# Patient Record
(demographics unavailable — no encounter records)

---

## 2024-11-06 NOTE — ASSESSMENT
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  PMH: Type II DM on alogliptin, HTN, HLD, hypothyroidism  S/p preemptive  donor kidney transplantation on 24.  Donor was 34 yrs old, DCD, KDPI 18%, Terminal Cr 0.73.  2 renal arteries to patch, 1 vein and ureter with stent in ureteroneocystostomy. CIT: 22 hours CMV +/+, EBV +/+  S/p preemptive DCD DDRT on 24 Good graft function, Cr 0.79mg/dL on last visit, pending today, no proteinuria down from 4.8 grams pre transplant  RLQ pain/discomfort - obtain transplant kidney ultrasound   Immunosuppression  Thymoglobulin induction completed  On Envarsus 7mg po daily. Aim for trough ~ 8 Reduce MMF to 500mg po BID, Mild leukopenia 3.3 Prednisone 5mg daily   Infection prophylaxis:  Valcyte 450mg po daily x 6 months- intermediate risk for CMV  Bactrim SS daily   DM type II : A1c 7%.  Continue Alogliptin 12.5mg daily   Hypothyroidism - Continue Synthroid 88mcg daily    HTN - BP acceptable at home without meds.  BP high in office today b/c she is anxious and upset about the election results.    Anemia - multifactorial - CKD, surgery, meds. Received epo 10,000 units sq x1  and . Currently on Procrit 10,000 units sq week, Hgb improved received last dose of Procrit on 24.    Hypomagnesemia -Continue supplement 400gm po bid   Vitamin D insufficiency -Vitamin D 1000 units daily   Flu shot given in clinic 10/2024   Obtain transplant kidney US to evaluate RLQ pain  F/u scheduled 24

## 2024-11-06 NOTE — PHYSICAL EXAM
[General Appearance - Alert] : alert [General Appearance - In No Acute Distress] : in no acute distress [Sclera] : the sclera and conjunctiva were normal [PERRL With Normal Accommodation] : pupils were equal in size, round, and reactive to light [Extraocular Movements] : extraocular movements were intact [Oropharynx] : the oropharynx was normal [Neck Cervical Mass (___cm)] : no neck mass was observed [Jugular Venous Distention Increased] : there was no jugular-venous distention [Respiration, Rhythm And Depth] : normal respiratory rhythm and effort [Exaggerated Use Of Accessory Muscles For Inspiration] : no accessory muscle use [Auscultation Breath Sounds / Voice Sounds] : lungs were clear to auscultation bilaterally [Heart Rate And Rhythm] : heart rate was normal and rhythm regular [Heart Sounds] : normal S1 and S2 [Heart Sounds Gallop] : no gallops [Murmurs] : no murmurs [Edema] : there was no peripheral edema [Abdomen Soft] : soft [] : right dorsalis pedis palpable [No Focal Deficits] : no focal deficits [Oriented To Time, Place, And Person] : oriented to person, place, and time [FreeTextEntry1] : No tremors

## 2024-11-06 NOTE — HISTORY OF PRESENT ILLNESS
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 Nephrologist Dr. Mariela Pollard.  Underwent or pre-emptive  donor kidney transplant on 24.   Donor 34, DCD, KDPI 18%, Terminal Cr 0.73.  2 renal arteries to one patch, 1 vein and ureter with stent, CIT 22 hours.  Thymo induction used  Post op anemia received 2 units PRBC on  and , given Epogen.  Otherwise did well. Discharged on post op day # 4 with a declining serum creatinine of 1.28mg/dL  Transplant ureteric stent removed on 24   Presents for a scheduled follow up visit. Last seen 4 weeks ago.  Had abdominal pain and  RLE and bilateral LE swelling 3 weeks ago. US done showed no DVT.  LE swelling subsided.  But still has pain in the right lower abdomen.  Voiding urine without difficulty. No dysuria or hematuria. No abdominal pain, nausea, vomiting or diarrhea. Appetite is good. No fever, chills. No chest pain or shortness of breath, no leg swelling.  BP at home 120-130 systolic.  Blood glucose is well controlled.  Weight 150 lbs stable.  Walking 2-3 miles a day. Walks her dog.

## 2024-12-04 NOTE — PHYSICAL EXAM
[General Appearance - Alert] : alert [General Appearance - In No Acute Distress] : in no acute distress [Sclera] : the sclera and conjunctiva were normal [PERRL With Normal Accommodation] : pupils were equal in size, round, and reactive to light [Extraocular Movements] : extraocular movements were intact [Oropharynx] : the oropharynx was normal [Jugular Venous Distention Increased] : there was no jugular-venous distention [Neck Cervical Mass (___cm)] : no neck mass was observed [Respiration, Rhythm And Depth] : normal respiratory rhythm and effort [Exaggerated Use Of Accessory Muscles For Inspiration] : no accessory muscle use [Auscultation Breath Sounds / Voice Sounds] : lungs were clear to auscultation bilaterally [Heart Rate And Rhythm] : heart rate was normal and rhythm regular [Heart Sounds] : normal S1 and S2 [Heart Sounds Gallop] : no gallops [Murmurs] : no murmurs [Edema] : there was no peripheral edema [Abdomen Soft] : soft [] : right dorsalis pedis palpable [No Focal Deficits] : no focal deficits [Oriented To Time, Place, And Person] : oriented to person, place, and time [Abdomen Tenderness] : non-tender [FreeTextEntry1] : No tremors

## 2024-12-04 NOTE — ASSESSMENT
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  PMH: Type II DM on alogliptin, HTN, HLD, hypothyroidism  S/p preemptive  donor kidney transplantation on 24.  Donor was 34 yrs old, DCD, KDPI 18%, Terminal Cr 0.73.  2 renal arteries to patch, 1 vein and ureter with stent in ureteroneocystostomy. CIT: 22 hours CMV +/+, EBV +/+  S/p preemptive DCD DDRT on 24 Good graft function, Cr 0.68mg/dL on last visit, pending today, no proteinuria down from 4.8 grams pre transplant  Transplant kidney US on 24: No hydronephrosis or perinephric collection. No evidence of a significant renal artery stenosis.  Immunosuppression  Thymoglobulin induction completed  On Envarsus 7mg po daily. Aim for trough level 6- 8. On MMF  500mg po BID, dose lowered for mild leukopenia 3.3 Prednisone 5mg daily   Infection prophylaxis:  Valcyte 450mg po daily -Complete valcyte at the end of this month. intermediate risk for CMV  Continue Bactrim SS daily   DM type II : A1c 7%.  Continue Alogliptin 12.5mg daily   Hypothyroidism - Continue Synthroid 88mcg daily    HTN - BP elevated. Will start Amlodipine 5mg daily.   Anemia - multifactorial - CKD, surgery, meds. Received epo 10,000 units sq x1  and . Currently on Procrit 10,000 units sq week, Hgb improved received last dose of Procrit on 24.    Hypomagnesemia -Continue supplement 800gm po bid   Vitamin D insufficiency -Vitamin D 1000 units daily   HLD : continue rosuvastatin 10mg po qhs   Flu shot given in clinic 10/2024   Refer to Nephrologist Dr. Mariela Torres.  Labs in /u here scheduled 3/12/2025

## 2025-03-12 NOTE — HISTORY OF PRESENT ILLNESS
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 Nephrologist Dr. Mariela Pollard.  Underwent or pre-emptive  donor kidney transplant on 24.   Donor 34, DCD, KDPI 18%, Terminal Cr 0.73.  2 renal arteries to one patch, 1 vein and ureter with stent, CIT 22 hours.  Thymo induction used  Post op anemia received 2 units PRBC on  and , given Epogen.  Otherwise, did well. Discharged on post op day # 4 with a declining serum creatinine of 1.28mg/dL  Transplant ureteric stent removed on 24   Presents for a scheduled follow up visit. Last seen 3 months ago.  Blood glucose elevated with A1c of 11%, alogliptin discontinued and started on Ozempic by PCP.  Seen by primary Nephrologist Dr. Mariela Torres last month.  Feels well. Voiding urine without difficulty. No dysuria or hematuria.  No abdominal pain, nausea, vomiting or diarrhea. Appetite is good. No fever, chills. No chest pain or shortness of breath, no leg swelling.  BP at home 110//80 Checks Blood glucose once a day in AM Generally < 130.  Weight down 4lbs.  Walking 2-3 miles a day. Walks her dog.

## 2025-03-12 NOTE — ASSESSMENT
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 PMH: Type II DM on alogliptin, HTN, HLD, hypothyroidism S/p preemptive  donor kidney transplantation on 24. Donor was 34 yrs old, DCD, KDPI 18%, Terminal Cr 0.73. 2 renal arteries to patch, 1 vein and ureter with stent in ureteroneocystostomy. CIT: 22 hours CMV +/+, EBV +/+  S/p preemptive DCD DDRT on 24 Good graft function, Cr 0.7mg/dL on last visit, pending today, no proteinuria. Transplant kidney US on 24: No hydronephrosis or perinephric collection. No evidence of a significant renal artery stenosis.  Immunosuppression Thymoglobulin induction completed On Envarsus 7mg po daily. Aim for trough level 6- 8. On MMF 500mg po BID, dose lowered for mild leukopenia  Prednisone 5mg daily  Infection prophylaxis: Completed Valcyte 450mg po daily x 6 months prophylaxis  Continue Bactrim SS daily  DM type II : A1c  was 11% at PMD office up from 7.9% in December. Alogliptin discontinued and started Ozempic. Now on 0.5mg/week.  Weight down 4lbs.   Hypothyroidism - Continue Synthroid 88mcg daily  HTN - Controlled, continue Amlodipine 5mg po daily   Anemia - multifactorial - CKD, surgery, meds. Received epo 10,000 units sq x1  and . Hgb improved on Procrit, received last dose of Procrit on 24.  Hypomagnesemia -Resume supplement 800gm po bid  Vitamin D insufficiency -Vitamin D 2000 units daily  HLD : continue rosuvastatin 10mg po qhs  Flu shot given in clinic 10/2024 Follow up with Nephrologist Dr. Mariela Torres in May.  Return here scheduled .

## 2025-03-12 NOTE — PHYSICAL EXAM
[General Appearance - Alert] : alert [General Appearance - In No Acute Distress] : in no acute distress [Sclera] : the sclera and conjunctiva were normal [PERRL With Normal Accommodation] : pupils were equal in size, round, and reactive to light [Extraocular Movements] : extraocular movements were intact [Oropharynx] : the oropharynx was normal [Neck Cervical Mass (___cm)] : no neck mass was observed [Jugular Venous Distention Increased] : there was no jugular-venous distention [Respiration, Rhythm And Depth] : normal respiratory rhythm and effort [Exaggerated Use Of Accessory Muscles For Inspiration] : no accessory muscle use [Auscultation Breath Sounds / Voice Sounds] : lungs were clear to auscultation bilaterally [Heart Rate And Rhythm] : heart rate was normal and rhythm regular [Heart Sounds] : normal S1 and S2 [Heart Sounds Gallop] : no gallops [Murmurs] : no murmurs [Edema] : there was no peripheral edema [Abdomen Soft] : soft [Abdomen Tenderness] : non-tender [] : right dorsalis pedis palpable [No Focal Deficits] : no focal deficits [Oriented To Time, Place, And Person] : oriented to person, place, and time [FreeTextEntry1] : No tremors

## 2025-05-19 NOTE — HISTORY OF PRESENT ILLNESS
[FreeTextEntry1] : 64 yr old female with past medical history remarkable for CKD stage V due to alport syndrome s/p DDRT 6/24/24, HTN, HLD, hypothyroidism presenting to establish care for type 2 diabetes mellitus. Records were reviewed. Accompanied by her daughter to visit today, patient speaks Romanian and prefers to have daughter translating  PCP: Dr. Andres Schwarz  Hypothyroidism Diagnosed ~8 yrs ago (2017) Currently on synthroid 88 mcg daily Reports she had thyroid levels checked by PCP recently in March and were normal Denies cold intolerance, constipation, excessive fatigue Reports energy levels are good No fhx of thyroid disease  Current prednisone dose: 5 mg daily  Regarding diabetes history: Diagnosed with T2DM 15 yrs ago Hx of HHS/DKA: denies  A1c trend: 9.3%- 5/2025 11% - 3/2025 per patient report 7.9%- 12/2024 7% 9/2024  Current regimen: - Ozempic 1 mg weekly on Tuesdays (has taken for the past 1.5 months)  Denies nausea/vomiting, feeling well with Ozempic  Previous therapies tried: - Alogliptin -> stopped with start of ozempic - Metformin prior to kidney transplant  Glycemic monitoring: She is only checking in the morning once a day, did not bring glucometer to visit. Self report below: -240s in mornings previously prior to increase in Ozempic to 1 mg; lately -140s in mornings, this morning her fasting BG was 131 mg/dL She is interested in CGM, has never used previously  Hypoglycemia symptoms/events: denies  Diet: Breakfast- 2 boiled eggs; sometimes smoothie with banana, strawberries, oatmeal, and nuts  Lunch- eating 2 tortillas  Snacks- fruit or banana bread w/oatmeal (no sugar) Not eating too many vegetables lately  Sugary beverages- avoids, mostly drinks water  Physical activity: walking 2-3 miles a day  Family history of DM in mother and sisters   Diabetes complications and comorbidities: Retinopathy- denies, last seen by ophthalmology on 11/2024 Neuropathy- denies Nephropathy- hx of CKD V due to alport syndrome now s/p DDRT 6/2024 Last GFR: 95 on 4/2025 labs and UPC 0.1 on 4/2025  HTN- yes on amlodipine 5 mg daily Coronary artery disease- denies CVA- denies Dyslipidemia- yes on rosuvastatin 10 mg qhs Last lipid panel: , chol 216, HDL 89,  on 12/2024  Social: Lives with daughter  Tobacco use- denies ETOH- denies

## 2025-05-19 NOTE — HISTORY OF PRESENT ILLNESS
[FreeTextEntry1] : 64 yr old female with past medical history remarkable for CKD stage V due to alport syndrome s/p DDRT 6/24/24, HTN, HLD, hypothyroidism presenting to establish care for type 2 diabetes mellitus. Records were reviewed. Accompanied by her daughter to visit today, patient speaks Kazakh and prefers to have daughter translating  PCP: Dr. Andres Schwarz  Hypothyroidism Diagnosed ~8 yrs ago (2017) Currently on synthroid 88 mcg daily Reports she had thyroid levels checked by PCP recently in March and were normal Denies cold intolerance, constipation, excessive fatigue Reports energy levels are good No fhx of thyroid disease  Current prednisone dose: 5 mg daily  Regarding diabetes history: Diagnosed with T2DM 15 yrs ago Hx of HHS/DKA: denies  A1c trend: 9.3%- 5/2025 11% - 3/2025 per patient report 7.9%- 12/2024 7% 9/2024  Current regimen: - Ozempic 1 mg weekly on Tuesdays (has taken for the past 1.5 months)  Denies nausea/vomiting, feeling well with Ozempic  Previous therapies tried: - Alogliptin -> stopped with start of ozempic - Metformin prior to kidney transplant  Glycemic monitoring: She is only checking in the morning once a day, did not bring glucometer to visit. Self report below: -240s in mornings previously prior to increase in Ozempic to 1 mg; lately -140s in mornings, this morning her fasting BG was 131 mg/dL She is interested in CGM, has never used previously  Hypoglycemia symptoms/events: denies  Diet: Breakfast- 2 boiled eggs; sometimes smoothie with banana, strawberries, oatmeal, and nuts  Lunch- eating 2 tortillas  Snacks- fruit or banana bread w/oatmeal (no sugar) Not eating too many vegetables lately  Sugary beverages- avoids, mostly drinks water  Physical activity: walking 2-3 miles a day  Family history of DM in mother and sisters   Diabetes complications and comorbidities: Retinopathy- denies, last seen by ophthalmology on 11/2024 Neuropathy- denies Nephropathy- hx of CKD V due to alport syndrome now s/p DDRT 6/2024 Last GFR: 95 on 4/2025 labs and UPC 0.1 on 4/2025  HTN- yes on amlodipine 5 mg daily Coronary artery disease- denies CVA- denies Dyslipidemia- yes on rosuvastatin 10 mg qhs Last lipid panel: , chol 216, HDL 89,  on 12/2024  Social: Lives with daughter  Tobacco use- denies ETOH- denies

## 2025-05-19 NOTE — HISTORY OF PRESENT ILLNESS
[FreeTextEntry1] : 64 yr old female with past medical history remarkable for CKD stage V due to alport syndrome s/p DDRT 6/24/24, HTN, HLD, hypothyroidism presenting to establish care for type 2 diabetes mellitus. Records were reviewed. Accompanied by her daughter to visit today, patient speaks Romansh and prefers to have daughter translating  PCP: Dr. Andres Schwarz  Hypothyroidism Diagnosed ~8 yrs ago (2017) Currently on synthroid 88 mcg daily Reports she had thyroid levels checked by PCP recently in March and were normal Denies cold intolerance, constipation, excessive fatigue Reports energy levels are good No fhx of thyroid disease  Current prednisone dose: 5 mg daily  Regarding diabetes history: Diagnosed with T2DM 15 yrs ago Hx of HHS/DKA: denies  A1c trend: 9.3%- 5/2025 11% - 3/2025 per patient report 7.9%- 12/2024 7% 9/2024  Current regimen: - Ozempic 1 mg weekly on Tuesdays (has taken for the past 1.5 months)  Denies nausea/vomiting, feeling well with Ozempic  Previous therapies tried: - Alogliptin -> stopped with start of ozempic - Metformin prior to kidney transplant  Glycemic monitoring: She is only checking in the morning once a day, did not bring glucometer to visit. Self report below: -240s in mornings previously prior to increase in Ozempic to 1 mg; lately -140s in mornings, this morning her fasting BG was 131 mg/dL She is interested in CGM, has never used previously  Hypoglycemia symptoms/events: denies  Diet: Breakfast- 2 boiled eggs; sometimes smoothie with banana, strawberries, oatmeal, and nuts  Lunch- eating 2 tortillas  Snacks- fruit or banana bread w/oatmeal (no sugar) Not eating too many vegetables lately  Sugary beverages- avoids, mostly drinks water  Physical activity: walking 2-3 miles a day  Family history of DM in mother and sisters   Diabetes complications and comorbidities: Retinopathy- denies, last seen by ophthalmology on 11/2024 Neuropathy- denies Nephropathy- hx of CKD V due to alport syndrome now s/p DDRT 6/2024 Last GFR: 95 on 4/2025 labs and UPC 0.1 on 4/2025  HTN- yes on amlodipine 5 mg daily Coronary artery disease- denies CVA- denies Dyslipidemia- yes on rosuvastatin 10 mg qhs Last lipid panel: , chol 216, HDL 89,  on 12/2024  Social: Lives with daughter  Tobacco use- denies ETOH- denies

## 2025-05-19 NOTE — ASSESSMENT
[FreeTextEntry1] : 64 yr old female with past medical history remarkable for CKD stage V due to alport syndrome s/p DDRT 6/24/24, HTN, HLD, hypothyroidism presenting to establish care for type 2 diabetes mellitus  #Hypothyroidism - Currently on synthroid 88 mcg daily and clinically appears euthyroid, can continue current dose - Will request recent lab records from PCP (reports she had normal thyroid levels in March 2025)  #Type 2 Diabetes #Steroid induced hyperglycemia Current prednisone: 5 mg daily Uncontrolled with HbA1c 9.3%  BG monitoring: - Advised increased frequency of glc checks to 3 times daily before meals - Will ask CDE to help with ordering CGM Discussed BG goals below: Target HbA1c <7% Pre-prandial target  mg/dL Post-prandial target <180 mg/dL  Medication regimen: - Increase Ozempic to 2 mg weekly - Start prandin 1 mg before lunch and before dinner meals - Discussed also possibility of using metformin in the future as kidney function is stable however will start with prandin, she reports she used metformin previously before her kidney transplant without adverse effect  - Discussed also benefits of SGLT2i for glc lowering and kidney protection however will not start today due to high risk of side effects with current uncontrolled BG (glc level 350 mg today in clinic), will discuss further at next visit   Lifestyle modifications: Recommend increasing weekly physical activity with goal 150 minutes/week and dietary modifications- limiting sugary beverages and fried foods, incorporating more fruits/vegetables and whole grains in diet   Monitoring for complications: - Ophthalmology: UTD per patient, seen 11/2024 - Podiatry: exam today 5/2025 wnl, no active issues - Renal: following closely with nephrology, GFR 95 on 4/2025 labs and UPC 0.1 on 4/2025  HLD Lipid panel: , chol 216, HDL 89,  on 12/2024 - Continue rosuvastatin 10 mg , encourage low fat diet  Essential HTN with goal BP<130/80 Well controlled today - Continue amlodipine 5 mg daily  Overweight  BMI 27 - Counseled on lifestyle modifications: increased weekly physical activity with goal 150 minutes/week and dietary modifications- limiting sugary beverages and fried foods, incorporating more fruits/vegetables and whole grains in diet  RTC in 3-4 weeks for CDE visit RTC in 3 months for dm follow up

## 2025-06-18 NOTE — ASSESSMENT
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 PMH: Type II DM on alogliptin, HTN, HLD, hypothyroidism S/p preemptive  donor kidney transplantation on 24. Donor was 34 yrs old, DCD, KDPI 18%, Terminal Cr 0.73. 2 renal arteries to patch, 1 vein and ureter with stent in ureteroneocystostomy. CIT: 22 hours CMV +/+, EBV +/+  S/p preemptive DCD DDRT on 24 with excellent graft function. Baseline creatinine 0.7mg/dL, no proteinura.  Labs pending today   Immunosuppression Thymoglobulin induction completed On Envarsus 7mg po daily. Aim for trough level 5-7  On MMF 500mg po BID, dose lowered for mild leukopenia  Prednisone 5mg daily Continue Bactrim SS daily  DM type II: Diagnosed 15 years ago, previously well controlled on metformin and alogliptin.  Post transplant worsening diabetes with A1c  was 11% in Dec 2024  Alogliptin discontinued and started Ozempic. Now on 2mg/week and Prandin 1mg pre meal. Glycemic control improving.   Hypothyroidism - Continue Synthroid 88mcg daily  HTN - Controlled, continue Amlodipine 5mg po daily   Hypomagnesemia -Continue supplement 800gm po bid  Vitamin D insufficiency -Vitamin D 2000 units daily  HLD : continue rosuvastatin 10mg po qhs  Flu shot received in 10/2024 Follow up with Nephrologist Dr. Mariela Torres - In August  Dermatology evaluation for skin cancer screening  Return here 6 months

## 2025-06-18 NOTE — HISTORY OF PRESENT ILLNESS
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 Nephrologist Dr. Mariela Pollard.  Underwent or pre-emptive  donor kidney transplant on 24.   Donor 34, DCD, KDPI 18%, Terminal Cr 0.73.  2 renal arteries to one patch, 1 vein and ureter with stent, CIT 22 hours.  Thymo induction used  Post op anemia received 2 units PRBC on  and , given Epogen.  Otherwise, did well. Discharged on post op day # 4 with a declining serum creatinine of 1.28mg/dL  Transplant ureteric stent removed on 24   Presents for a scheduled follow up visit. Last seen 3 months ago.  Treated with cipro for symptomatic UTI in March.  Went to ED for sore throat 2 week ago, RVP was negative and she was sent home.  Followed by primary Nephrologist Dr. Mariela Torres - has appt in August.  Saw endocrine in [facility] for uncontrolled DM with A1c of 9.3%, started continuous glucose monitoring, Ozepmic increased to 2mg weekly and started on prandin before meals. Blood glucose improving mostly < 200.   Feels well. Voiding urine without difficulty. No dysuria or hematuria.  No abdominal pain, nausea, vomiting or diarrhea. Appetite is good. No fever, chills. No chest pain or shortness of breath, no leg swelling.  -130 systolic.  Weight down to 141 lbs - lost 7lbs.  Walking 2-3 miles a day. Walks her dog. Also started lining free weights.

## 2025-06-18 NOTE — PHYSICAL EXAM
[General Appearance - Alert] : alert [General Appearance - In No Acute Distress] : in no acute distress [Sclera] : the sclera and conjunctiva were normal [PERRL With Normal Accommodation] : pupils were equal in size, round, and reactive to light [Extraocular Movements] : extraocular movements were intact [Oropharynx] : the oropharynx was normal [Neck Cervical Mass (___cm)] : no neck mass was observed [Jugular Venous Distention Increased] : there was no jugular-venous distention [Respiration, Rhythm And Depth] : normal respiratory rhythm and effort [Exaggerated Use Of Accessory Muscles For Inspiration] : no accessory muscle use [Auscultation Breath Sounds / Voice Sounds] : lungs were clear to auscultation bilaterally [Heart Rate And Rhythm] : heart rate was normal and rhythm regular [Heart Sounds] : normal S1 and S2 [Murmurs] : no murmurs [Heart Sounds Gallop] : no gallops [Edema] : there was no peripheral edema [Abdomen Soft] : soft [Abdomen Tenderness] : non-tender [] : right dorsalis pedis palpable [No Focal Deficits] : no focal deficits [Oriented To Time, Place, And Person] : oriented to person, place, and time [FreeTextEntry1] : No tremors

## 2025-06-23 NOTE — HISTORY OF PRESENT ILLNESS
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 She underwent decreased donor kidney transplant on 6/24/2024

## 2025-06-23 NOTE — PHYSICAL EXAM
[FreeTextEntry3] : yellowish telangiectatic papule on left cheek, under larger reddish papule on left cheek  scattered brown macules on trunk and extremities

## 2025-06-23 NOTE — ASSESSMENT
[FreeTextEntry1] :  # Neoplasm of uncertain behavior ddx Procedure Note: Biopsy by SH[facility] BIOPSY, LEFT CHEEK The risks/benefits/alternatives of skin biopsy were explained to the patient, which include and are not limited to bleeding, infection, scarring or discoloration of skin, and recurrence of lesion. Patient expressed understanding of these risks and provided consent to the procedure. Time out with verification of patient and lesion site was performed. Site was prepped with rubbing alcohol, lidocaine with epinephrine was injected for anesthesia, and biopsy was performed. Specimen sent to path. Procedure was without complication and well tolerated. Wound care was discussed.  #multiple nevi 4. Lentigines 5. Cherry angioma Full body exam today. No concerning lesions for melanoma or NMSC clinically or under dermoscopy on todays exam. Benign findings as above. - Patient educated on ABCDEs of melanoma screening  - Recommend self skin exam monthly, annual exam by MD - Photoprotection reviewed including sun-protective behaviors, protective clothing, and the use of OTC broad-spectrum SPF 30+ sunscreens was advised  - RTC if develops lesions that are new, symptomatic (bleeding, itching), changing in size/color/shape   RTC 6 months